# Patient Record
Sex: FEMALE | Race: WHITE
[De-identification: names, ages, dates, MRNs, and addresses within clinical notes are randomized per-mention and may not be internally consistent; named-entity substitution may affect disease eponyms.]

---

## 2021-11-10 ENCOUNTER — HOSPITAL ENCOUNTER (OUTPATIENT)
Dept: HOSPITAL 63 - MAMMO | Age: 61
End: 2021-11-10
Attending: FAMILY MEDICINE
Payer: COMMERCIAL

## 2021-11-10 DIAGNOSIS — M85.88: ICD-10-CM

## 2021-11-10 DIAGNOSIS — Z12.31: Primary | ICD-10-CM

## 2021-11-10 PROCEDURE — 77063 BREAST TOMOSYNTHESIS BI: CPT

## 2021-11-10 PROCEDURE — 77080 DXA BONE DENSITY AXIAL: CPT

## 2021-11-10 PROCEDURE — 77067 SCR MAMMO BI INCL CAD: CPT

## 2021-11-10 NOTE — RAD
EXAM: BONE DENSITY STUDY - DEXA



DATE: 11/10/2021 12:08 PM



INDICATION: Menopausal, screening, tobacco use, thyroid medication. 



COMPARISON: No Prior



TECHNIQUE: Lumbar vertebral and proximal femoral dual-energy X-ray absorptiometry (DXA) was performed
 on a central [place] device. 



FINDINGS:



SPINE ANALYSIS RESULTS:  

Average lumbar bone mineral density (BMD) (g/cm2):  1.279

Lumbar T-score (standard deviation relative to young adult mean BMD):  0.8

Lumbar Z-score (standard deviation relative to age matched control group):  2.1



This may be artifactually elevated by degenerative change.



SPINE CLASSIFICATION:  Normal (T-score > -1.0).



HIP ANALYSIS RESULTS:  

Right total femoral bone mineral density (BMD) (g/cm2):  0.783

Femur T-score (standard deviation relative to young adult mean BMD):  -1.8

Femur Z-score (standard deviation relative to age matched control group):  -0.8



HIP CLASSIFICATION (World Health Organization):  Osteopenia (T-score -1.0 to -2.5).  



Note:  The 2009 International Society for Clinical Densitometry (ISCD) Official Positions state that 
osteoporosis in blaire-menopausal and post-menopausal women and in men age 50 and older may be diagnose
d if the T-score of the lumbar spine, total hip, or femoral neck is -2.5 or less. Hip BMD is reported
 from the femoral neck or total proximal femur whichever is lowest.  In cases where lumbar or hip BMD
 cannot be obtained or is not valid, distal forearm BMD is acceptable.  



IMPRESSION:

Based on the World Health Organization Guidelines, Bone Mineral Density values 

of osteopenia in the right hip and normal bone mineral density in the spine.



Electronically signed by: Robert Larkin MD (11/10/2021 4:52 PM) [id number]

## 2021-11-15 NOTE — RAD
BILATERAL DIGITAL SCREENING 2-D MAMMOGRAM



INDICATION: Routine screening. 



COMPARISON:  6/12/2019, 5/22/2018, 5/23/2017



Interpretation was made using CAD.



FINDINGS:

Breast Density: The breasts are heterogeneously dense, which may obscure small masses. 



RIGHT BREAST: No suspicious masses, calcifications or areas of architectural distortion are seen.



LEFT BREAST: No suspicious masses, calcifications or areas of architectural distortion are seen.



IMPRESSION:

1.  No imaging evidence of malignancy.



ASSESSMENT: BI-RADS 1: Negative.



RECOMMENDATION: Routine annual screening mammogram.





The facility will notify the patient of the results via mail. Patient information will be entered int
o the mammography reminder system with a target recall date for the next mammogram. A reminder letter
 will be generated by the facility.





Electronically signed by: Ivan Bains MD (11/15/2021 10:02 AM) [id number]